# Patient Record
Sex: FEMALE | Race: WHITE
[De-identification: names, ages, dates, MRNs, and addresses within clinical notes are randomized per-mention and may not be internally consistent; named-entity substitution may affect disease eponyms.]

---

## 2020-01-27 ENCOUNTER — HOSPITAL ENCOUNTER (OUTPATIENT)
Dept: HOSPITAL 53 - M SFHCWAGY | Age: 30
End: 2020-01-27
Attending: NURSE PRACTITIONER
Payer: SELF-PAY

## 2020-01-27 DIAGNOSIS — Z12.4: Primary | ICD-10-CM

## 2020-01-27 DIAGNOSIS — R87.610: ICD-10-CM

## 2020-01-27 PROCEDURE — 87624 HPV HI-RISK TYP POOLED RSLT: CPT

## 2020-08-21 ENCOUNTER — HOSPITAL ENCOUNTER (OUTPATIENT)
Dept: HOSPITAL 53 - M LRY | Age: 30
End: 2020-08-21
Attending: PHYSICIAN ASSISTANT
Payer: COMMERCIAL

## 2020-08-21 DIAGNOSIS — R07.1: Primary | ICD-10-CM

## 2020-08-21 PROCEDURE — 96372 THER/PROPH/DIAG INJ SC/IM: CPT

## 2020-08-21 PROCEDURE — 71046 X-RAY EXAM CHEST 2 VIEWS: CPT

## 2020-09-25 NOTE — REP
CHEST X-RAY: 2-VIEWS



HISTORY: Sharp pain with inspiration.



FINDINGS: 

The lungs are well-inflated and clear. The pleural angles are sharp. Heart size 
is normal. Mediastinal silhouette is unremarkable. Pulmonary vasculature is not 
increased. No bony abnormality is seen. 



IMPRESSION: 

Negative chest x-ray.  

MTDD